# Patient Record
Sex: FEMALE | Race: WHITE
[De-identification: names, ages, dates, MRNs, and addresses within clinical notes are randomized per-mention and may not be internally consistent; named-entity substitution may affect disease eponyms.]

---

## 2022-12-27 ENCOUNTER — NURSE TRIAGE (OUTPATIENT)
Dept: OTHER | Facility: CLINIC | Age: 42
End: 2022-12-27

## 2022-12-28 NOTE — TELEPHONE ENCOUNTER
Pt had toenail removed 2 weeks ago and now has a raw toe with questions on how to wrap it for tonight so the gauze does not stick to skin- advised per medical resource     Pt has appt with doctor tomorrow- declined triage   Reason for Disposition   Health Information question, no triage required and triager able to answer question    Protocols used:  Information Only Call - No Triage-ADULT-